# Patient Record
(demographics unavailable — no encounter records)

---

## 2017-11-15 NOTE — HISTORY AND PHYSICAL REPORT
History of Present Illness


Date of examination: 11/15/17


Date of admission: 


11/15/17 08:52





History of present illness: 





22 yo  ultrasound EDC 17 @ 39 weeks gestation presented for 

induction secondary to gestational diabetes. Patient is a transfer into care at 

32 weeks. Prenatal course complicated by diet control gestational diabetes with 

MFM comang't. She is GBS negative. 





Past History


Past Medical History: no pertinent history


Past Surgical History: no surgical history


Family/Genetic History: other (dipolar)





- Obstetrical History


Expected Date of Delivery: 17


Actual Gestation: 39 Week(s) 0 Day(s) 


: 2


Para: 1


Hx # Term Pregnancies: 1


Number of Living Children: 1





Medications and Allergies


 Allergies











Allergy/AdvReac Type Severity Reaction Status Date / Time


 


latex Allergy Intermediate Itching Verified 03/29/15 06:44











 Home Medications











 Medication  Instructions  Recorded  Confirmed  Last Taken  Type


 


Sertraline HCl [Zoloft] 25 mg PO BID 03/29/15 03/29/15 05/01/13 History


 


Docusate Sodium [Colace] 100 mg PO BID PRN #60 capsule 03/31/15  Unknown Rx


 


Ferrous Sulfate [Feosol 325 MG tab] 325 mg PO BID #120 tablet 03/31/15  Unknown 

Rx


 


Ibuprofen [Motrin 800 MG tab] 800 mg PO TID PRN #30 tablet 03/31/15  Unknown Rx


 


Labetalol [Normodyne TAB] 400 mg PO BID #60 tablet 03/31/15  Unknown Rx


 


Sertraline [Zoloft] 50 mg PO QDAY #30 tablet 03/31/15  Unknown Rx


 


Ibuprofen [Motrin 800 MG tab] 800 mg PO Q8HR PRN #30 tablet 09/23/15  Unknown Rx


 


metroNIDAZOLE [Flagyl TAB] 500 mg PO Q12HR #14 tab 09/23/15  Unknown Rx


 


Doxylamine Succinate/Vit B6 1 each PO TID #20 tablet. 17  Unknown Rx





[Diclegis Dr 10-10 mg Tablet]     











Active Meds: 


Active Medications





Butorphanol Tartrate (Stadol)  2 mg IV Q2H PRN


   PRN Reason: Pain , Severe (7-10)


Dextrose (D50w (25gm) Vial)  25 gm IV PRN PRN


   PRN Reason: Hypoglycemia


Ephedrine Sulfate (Ephedrine Sulfate)  10 mg IV Q2M PRN


   PRN Reason: Hypotension


   Stop: 17 10:59


Fentanyl (Sublimaze)  100 mcg IV Q2H PRN


   PRN Reason: Labor Pain


Lactated Ringer's (Lactated Ringers)  1,000 mls @ 125 mls/hr IV AS DIRECT XIOMARA


   Last Admin: 11/15/17 10:48 Dose:  125 mls/hr


Oxytocin/Sodium Chloride (Pitocin/Ns 20 Unit/1000ml Drip)  20 units in 1,000 

mls @ 125 mls/hr IV AS DIRECT XIOMARA


Oxytocin/Sodium Chloride (Pitocin/Ns 30 Unit/500ml)  30 units in 500 mls @ 4 mls

/hr IV TITR XIOMARA


   PRN Reason: Protocol


   Last Titration: 11/15/17 12:52 Dose:  12 ml/hr, 12 mls/hr


Insulin Human Regular (Novolin R)  0 units SUB-Q Q6HR XIOMARA


   PRN Reason: Protocol


Mineral Oil (Mineral Oil)  30 ml PO QHS PRN


   PRN Reason: Constipation


Naloxone HCl (Narcan 0.4 Mg/1 Ml)  0.1 mg IV Q2MIN PRN


   PRN Reason: Res Rate </= 8 or 02 SAT < 92%


Ondansetron HCl (Zofran)  4 mg IV Q8H PRN


   PRN Reason: Nausea And Vomiting


Terbutaline Sulfate (Brethine)  0.25 mg SUB-Q ONCE PRN


   PRN Reason: Hyperstimulation/Hypertonicity


   Stop: 17 10:59


Terbutaline Sulfate (Brethine)  0.25 mg IVP ONCE PRN


   PRN Reason: Hyperstimulation/Hypertonicity


   Stop: 17 10:59











Review of Systems


All systems: negative


Genitourinary: normal appearance, contractions, no vaginal bleeding, no vaginal 

discharge, no leakage of fluid, no genital sores





- Vital Signs


Vital signs: 


 Vital Signs











Pulse BP


 


 75   156/79 


 


 11/15/17 09:05  11/15/17 09:05








 











Temp Pulse Resp BP Pulse Ox


 


 97.2 F L  78   18   119/73   100 


 


 11/15/17 10:09  11/15/17 13:22  11/15/17 10:09  11/15/17 12:45  11/15/17 13:22














- Obstetrical


FHR: category 2


Uterine Contraction Monitor Mode: External


Cervical Dilatation: 4


Cervical Effacement Percentage: 80


Fetal station: -2


Uterine Contraction Frequency (min): 4


Uterine Contraction Pattern: Regular


Uterine Tone Measurement Phase: Resting


Uterine Contraction Intensity: Moderate





Results


Result Diagrams: 


 11/15/17 10:40





 Abnormal lab results











  11/15/17 Range/Units





  10:40 


 


MCH  25 L  (28-32)  pg


 


RDW  15.5 H  (13.2-15.2)  %








All other labs normal.








Assessment and Plan





A: IUP at 39 weeks


Induction of Labor


Diabetic


P: AROM-clear


Pitocin

## 2017-11-15 NOTE — PROCEDURE NOTE
OB Delivery Note





- Delivery


Date of Delivery: 11/15/17 (6lb @ 9155)


Surgeon: DEMETRIO NIEVES


Estimated blood loss: <100cc





- Vaginal


Delivery presentation: vertex


Delivery position: OA


Delivery induction: oxytocin


Delivery augmentation: rupture of membranes, pitocin


Delivery monitor: external FHT, external uterine


Route of delivery: 


Delivery placenta: spontaneous


Delivery cord: 3 umbilical vessels


Episiotomy: none


Delivery laceration: none


Anesthesia: none





- Infant


  ** A


Apgar at 1 minute: 8


Apgar at 5 minutes: 9


Infant Gender: Female (5cm @ 1415, sitting with epidural, urge to push.  

viable male infant by RN. Spont. placenta. Bleeding scant. Pitocin infusing. No 

lacerations.)

## 2017-11-16 NOTE — DISCHARGE SUMMARY
Providers





- Providers


Date of Admission: 


11/15/17 08:52





Date of discharge: 17


Attending physician: 


OMEGA BA





Primary care physician: 


OMEGA BA








Hospitalization


Reason for admission: induction of labor, IUP at term


Delivery: 


Episiotomy: none


Laceration: none


Other postpartum procedures: none


Postpartum complications: none


Discharge diagnosis: IUP at term delivered


[place] baby: female


Condition at discharge: Good


Disposition: DC-01 TO HOME OR SELFCARE





Plan





- Discharge Medications


Prescriptions: 


Ibuprofen [Motrin 800 MG tab] 800 mg PO Q8H PRN #30 tablet


 PRN Reason: Pain, Mild (1-3)





- Provider Discharge Summary


Activity: routine, no sex for 6 weeks, no heavy lifting 4 weeks, no strenuous 

exercise


Diet: routine


Instructions: routine


Additional instructions: 


[]  Smoking cessation referral if applicable(refer to patient education folder 

for contact #)


[]  Refer to [facility]'s [facility] Booklet








Call your doctor immediately for:


* Fever > 100.5


* Heavy vaginal bleeding ( >1 pad per hour)


* Severe persistent headache


* Shortness of breath


* Reddened, hot, painful area to leg or breast


* Drainage or odor from incision.





* Keep incision clean and dry at all times and follow doctor's instructions 

regarding bathing/showering











- Follow up plan


Follow up: 


OMEGA BA MD [Primary Care Provider] -  (RTO 4 weeks postpartum )

## 2017-11-16 NOTE — PROGRESS NOTE
Assessment and Plan





O: VSS AF


PP H/H: pending


A: Stable PP Day 1


P: Request discharge in am





Subjective





- Subjective


Date of service: 17


Interval history: 





22 yo  ultrasound EDC 17 @ 39 weeks gestation presented for 

induction secondary to gestational diabetes. Patient is a transfer into care at 

32 weeks. Prenatal course complicated by diet control gestational diabetes with 

MFM comang't. She is GBS negative. 


Patient reports: appetite normal, voiding normally, pain well controlled, 

ambulating normally, other (bleeding moderate)


: doing well, nursing well





Objective





- Vital Signs


Latest vital signs: 


 Vital Signs











  Temp Pulse Resp BP BP Pulse Ox


 


 17 01:00  98.7 F  74  20   112/50 


 


 11/15/17 21:19  98.8 F  80  20   115/66 


 


 11/15/17 16:00  98.3 F  74  20   125/77 


 


 11/15/17 15:52   78     100


 


 11/15/17 15:47   85   120/62   100


 


 11/15/17 15:42   81     100


 


 11/15/17 15:37   75     100


 


 11/15/17 15:32   78   132/74   100


 


 11/15/17 15:27   83     100


 


 11/15/17 15:22   83     100


 


 11/15/17 15:18   88   143/75  


 


 11/15/17 15:03  96.7 F L   16   


 


 11/15/17 13:37   88     100


 


 11/15/17 13:32   78     100


 


 11/15/17 13:27   81     100


 


 11/15/17 13:22   78     100


 


 11/15/17 13:11   83     100


 


 11/15/17 13:06   74     100


 


 11/15/17 13:01   85     100


 


 11/15/17 12:56   76     100


 


 11/15/17 12:51   79     100


 


 11/15/17 12:46   78     100


 


 11/15/17 12:45   83   119/73  


 


 11/15/17 12:37   91 H     100


 


 11/15/17 12:32   91 H     100


 


 11/15/17 12:27   80     99


 


 11/15/17 12:22   81     99


 


 11/15/17 12:17   87     99


 


 11/15/17 12:12   80     99


 


 11/15/17 10:09  97.2 F L   18   


 


 11/15/17 09:19   103 H   123/76  


 


 11/15/17 09:06   84   139/80  


 


 11/15/17 09:05   75   156/79  








 Intake and Output











 11/15/17 11/16/17 11/16/17





 22:59 06:59 14:59


 


Intake Total 360  


 


Balance 360  


 


Intake:   


 


  Intake, Free Water 360  


 


Other:   


 


  # Voids   


 


    Void 1  


 


  Estimated Blood Loss 100  














- Exam


Breasts: Present: deferred


Abdomen: Present: normal appearance, soft.  Absent: distention, tenderness


Vulva: both: normal


Uterus: Present: normal, firm, fundal height below umbilicus.  Absent: bogginess

, tenderness


Extremities: Present: normal





- Labs


Labs: 


 Abnormal lab results











  11/15/17 11/15/17 Range/Units





  10:40 22:20 


 


MCH  25 L   (28-32)  pg


 


RDW  15.5 H   (13.2-15.2)  %


 


POC Glucose   132 H  ()